# Patient Record
Sex: FEMALE | URBAN - METROPOLITAN AREA
[De-identification: names, ages, dates, MRNs, and addresses within clinical notes are randomized per-mention and may not be internally consistent; named-entity substitution may affect disease eponyms.]

---

## 2020-10-15 ENCOUNTER — OFFICE VISIT (OUTPATIENT)
Dept: URBAN - METROPOLITAN AREA CLINIC 75 | Facility: CLINIC | Age: 75
End: 2020-10-15
Payer: MEDICARE

## 2020-10-15 PROCEDURE — 92014 COMPRE OPH EXAM EST PT 1/>: CPT | Performed by: OPTOMETRIST

## 2020-10-15 PROCEDURE — 92133 CPTRZD OPH DX IMG PST SGM ON: CPT | Performed by: OPTOMETRIST

## 2020-10-15 PROCEDURE — 92134 CPTRZ OPH DX IMG PST SGM RTA: CPT | Performed by: OPTOMETRIST

## 2020-10-15 ASSESSMENT — INTRAOCULAR PRESSURE
OD: 19
OS: 19

## 2020-10-15 NOTE — IMPRESSION/PLAN
Impression: Puckering of macula, right eye: H35.371. Plan: OD: Discussed diagnosis in detail with patient. Discussed treatment options with patient. Discussed risks and patient understands. Will continue to observe condition and or symptoms.

## 2020-12-07 ENCOUNTER — OFFICE VISIT (OUTPATIENT)
Dept: URBAN - METROPOLITAN AREA CLINIC 75 | Facility: CLINIC | Age: 75
End: 2020-12-07
Payer: COMMERCIAL

## 2020-12-07 DIAGNOSIS — H52.4 PRESBYOPIA: Primary | ICD-10-CM

## 2020-12-07 DIAGNOSIS — H35.371 PUCKERING OF MACULA, RIGHT EYE: ICD-10-CM

## 2020-12-07 PROCEDURE — 92014 COMPRE OPH EXAM EST PT 1/>: CPT | Performed by: OPTOMETRIST

## 2020-12-07 ASSESSMENT — KERATOMETRY
OD: 41.88
OS: 42.25

## 2020-12-07 ASSESSMENT — INTRAOCULAR PRESSURE
OS: 12
OD: 12

## 2020-12-07 ASSESSMENT — VISUAL ACUITY
OS: 20/25
OD: 20/30

## 2020-12-07 NOTE — IMPRESSION/PLAN
Impression: Presbyopia: H52.4 - I counseled the patient regarding the following: Ty Goes occurs as we get older and is the inability to focus on objects (ie: accomodate) due to the loss of flexiblity of your natural lens. Reading glasses, prism glasses, bifocals, trifocals or contact lenses can be helpful. Plan: New glasses rx given to patient today. 
Pt to contact office if difficulty with vision persists despite corrective eyewear

## 2020-12-07 NOTE — IMPRESSION/PLAN
Impression: Puckering of macula, right eye: H35.371 - Discussed diagnosis in detail with patient. OCT done in October and ERM was stable. Plan: Observe.  Will re-eval w/ OCT in April

## 2021-01-18 ENCOUNTER — OFFICE VISIT (OUTPATIENT)
Dept: URBAN - METROPOLITAN AREA CLINIC 75 | Facility: CLINIC | Age: 76
End: 2021-01-18

## 2021-01-18 DIAGNOSIS — H53.9 VISUAL DISTURBANCE: Primary | ICD-10-CM

## 2021-01-18 PROCEDURE — V2799 MISC VISION ITEM OR SERVICE: HCPCS | Performed by: OPTOMETRIST

## 2021-01-18 NOTE — IMPRESSION/PLAN
Impression: Visual disturbance: H53.9 - Pt here for refraction recheck. Plan: No changes to rx today. Discussed lenses appear to have been placed in frame upside down with add segment up top. Pt to see optical department for adjustments to lens placement.

## 2021-02-02 ENCOUNTER — OFFICE VISIT (OUTPATIENT)
Dept: URBAN - METROPOLITAN AREA CLINIC 75 | Facility: CLINIC | Age: 76
End: 2021-02-02

## 2021-02-02 PROCEDURE — V2799 MISC VISION ITEM OR SERVICE: HCPCS | Performed by: OPTOMETRIST

## 2021-02-02 ASSESSMENT — VISUAL ACUITY
OS: 20/20
OD: 20/30

## 2021-02-02 NOTE — IMPRESSION/PLAN
Impression: Presbyopia: H52.4 - Pt here for refraction r/c. Plan: New glasses rx given to patient today. Most change is to OS lens. 

Keep appts as scheduled

## 2021-03-18 ENCOUNTER — OFFICE VISIT (OUTPATIENT)
Dept: URBAN - METROPOLITAN AREA CLINIC 75 | Facility: CLINIC | Age: 76
End: 2021-03-18
Payer: MEDICARE

## 2021-03-18 DIAGNOSIS — H10.45 OTHER CHRONIC ALLERGIC CONJUNCTIVITIS: Primary | ICD-10-CM

## 2021-03-18 PROCEDURE — 99213 OFFICE O/P EST LOW 20 MIN: CPT | Performed by: OPTOMETRIST

## 2021-03-18 RX ORDER — PREDNISOLONE ACETATE 10 MG/ML
1 % SUSPENSION/ DROPS OPHTHALMIC
Qty: 5 | Refills: 1 | Status: INACTIVE
Start: 2021-03-18 | End: 2021-04-15

## 2021-03-18 ASSESSMENT — INTRAOCULAR PRESSURE
OD: 16
OS: 14

## 2021-03-18 NOTE — IMPRESSION/PLAN
Impression: Other chronic allergic conjunctivitis: H10.45. Plan: Allergic conjunctivitis causes red, watery, itchy or gritty eyes. The condition is not usually painful and does not make eyes sensitive to light. 
Use Pred OD PRN

## 2021-04-15 ENCOUNTER — OFFICE VISIT (OUTPATIENT)
Dept: URBAN - METROPOLITAN AREA CLINIC 75 | Facility: CLINIC | Age: 76
End: 2021-04-15
Payer: MEDICARE

## 2021-04-15 DIAGNOSIS — H35.363 DRUSEN (DEGENERATIVE) OF MACULA, BILATERAL: ICD-10-CM

## 2021-04-15 PROCEDURE — 92014 COMPRE OPH EXAM EST PT 1/>: CPT | Performed by: OPTOMETRIST

## 2021-04-15 PROCEDURE — 92134 CPTRZ OPH DX IMG PST SGM RTA: CPT | Performed by: OPTOMETRIST

## 2021-04-15 ASSESSMENT — INTRAOCULAR PRESSURE
OS: 10
OD: 8

## 2021-04-15 NOTE — IMPRESSION/PLAN
Impression: Drusen (degenerative) of macula, bilateral: H35.363. Plan: Drusen with stable vision. Will continue to monitor vision and the patient has been instructed to call with any vision changes. Recommend starting eye vitamins and dark green vegetables.

## 2021-04-15 NOTE — IMPRESSION/PLAN
Impression: Puckering of macula, right eye: H35.371. Plan: OCT ordered- no progression seen in exam or scans. Discussed that ERMS do not usually require treatment, unless it is affecting patients daily life. Pt to contact office w/ any loss or distortion of vision.

## 2021-04-15 NOTE — IMPRESSION/PLAN
Impression: Combined forms of age-related cataract, bilateral: H25.813. Plan: Vision is stable. No treatment currently recommended. The patient will monitor vision changes and contact us with any decrease in vision.

## 2021-06-29 ENCOUNTER — OFFICE VISIT (OUTPATIENT)
Dept: URBAN - METROPOLITAN AREA CLINIC 75 | Facility: CLINIC | Age: 76
End: 2021-06-29
Payer: MEDICARE

## 2021-06-29 DIAGNOSIS — H43.812 VITREOUS DEGENERATION, LEFT EYE: ICD-10-CM

## 2021-06-29 PROCEDURE — 92014 COMPRE OPH EXAM EST PT 1/>: CPT | Performed by: OPTOMETRIST

## 2021-06-29 RX ORDER — PREDNISOLONE ACETATE 10 MG/ML
1 % SUSPENSION/ DROPS OPHTHALMIC
Qty: 5 | Refills: 0 | Status: INACTIVE
Start: 2021-06-29 | End: 2021-06-29

## 2021-06-29 ASSESSMENT — INTRAOCULAR PRESSURE
OS: 9
OD: 12

## 2021-06-29 NOTE — IMPRESSION/PLAN
Impression: Puckering of macula, right eye: H35.371-Optos performed. Plan: Epiretinal membranes do not usually require treatment, unless distorted vision or blurry vision occur. The main treatment consists of vitrectomy surgery with peeling off the ERM. Contact office if you experience a loss of vision or distortion.

## 2021-06-29 NOTE — IMPRESSION/PLAN
Impression: Dry eye syndrome of bilateral lacrimal glands: H04.123-Dry eyes account for the patient's complaints. Plan: Recommend 3-4 drops daily of Systane Balance or Refresh tears. Will begin pt on pred acetate BID for 2 weeks OU. Contact office if dry eye does not improve, worsens or blurs vision.

## 2021-06-29 NOTE — IMPRESSION/PLAN
Impression: Vitreous degeneration, left eye: H43.812-Posterior vitreous detachment accounts for the patient's complaints. Plan: Reviewed the signs and symptoms of a retinal tear and detachment in detail with the patient, including worsening flashes, new floaters, and development of a shadow/curtain in the peripheral visual field. The patient was advised to call immediately with any changes to [place] or increase in symptoms.

## 2021-10-01 ENCOUNTER — OFFICE VISIT (OUTPATIENT)
Dept: URBAN - METROPOLITAN AREA CLINIC 75 | Facility: CLINIC | Age: 76
End: 2021-10-01
Payer: COMMERCIAL

## 2021-10-01 PROCEDURE — 92014 COMPRE OPH EXAM EST PT 1/>: CPT

## 2021-10-01 ASSESSMENT — VISUAL ACUITY
OS: 20/20
OD: 20/20

## 2021-10-01 ASSESSMENT — INTRAOCULAR PRESSURE
OD: 12
OS: 13

## 2021-10-01 NOTE — IMPRESSION/PLAN
Impression: Vitreous degeneration, left eye: H43.812-Posterior vitreous detachment accounts for the patient's complaints. Plan: Reviewed the signs and symptoms of a retinal tear and detachment in detail with the patient, including worsening flashes, new floaters, and development of a shadow/curtain in the peripheral visual field. The patient was advised to call immediately with any changes to [address] [facility] or increase in symptoms.

## 2021-10-01 NOTE — IMPRESSION/PLAN
Impression: Puckering of macula, right eye: H35.371-Stable. Plan: Epiretinal membranes do not usually require treatment, unless distorted vision or blurry vision occur. The main treatment consists of vitrectomy surgery with peeling off the ERM. Contact office if you experience a loss of vision or distortion.

## 2021-10-01 NOTE — IMPRESSION/PLAN
Impression: Dry eye syndrome of bilateral lacrimal glands: H04.123-Dry eyes account for the patient's complaints. Plan: Recommend 3-4 drops daily of Systane Balance or Refresh tears and gel/ointment at bedtime. Will start patient FML BID for 2 weeks, then once daily for 1 week. then D/C. Contact office if dry eye does not improve, worsens or blurs vision. R/C dry eye in 1 month.

## 2021-10-14 ENCOUNTER — OFFICE VISIT (OUTPATIENT)
Dept: URBAN - METROPOLITAN AREA CLINIC 75 | Facility: CLINIC | Age: 76
End: 2021-10-14
Payer: MEDICARE

## 2021-10-14 PROCEDURE — 68761 CLOSE TEAR DUCT OPENING: CPT | Performed by: OPTOMETRIST

## 2021-10-14 PROCEDURE — 99213 OFFICE O/P EST LOW 20 MIN: CPT | Performed by: OPTOMETRIST

## 2021-10-14 ASSESSMENT — INTRAOCULAR PRESSURE
OD: 14
OS: 16

## 2021-10-14 NOTE — IMPRESSION/PLAN
Impression: Dry eye syndrome of bilateral lacrimal glands: H04.123. Fluoro causing issue for pt. Temporary medium Punctual plugs applied today. Plan: Discussed dry eye syndrome requires lubrication of the eye with artificial tears and nighttime ointments or gels. In addition, topical and oral anti-inflammatory medications are usually necessary to treat the associated ocular irritation. Use your eyedrops and lubricating ointments on a regular basis, and don't wait until your eyes get dry or start burning before using them. Pt  to stop fluorometholone and continue Lumify gtts. Pt to try temporary and return for medium permanent if symptoms are relived. Pt to contact office if dry eye syndrome does not improve or worsens despite treatment or if vision gets blurry.

## 2021-12-01 ENCOUNTER — OFFICE VISIT (OUTPATIENT)
Dept: URBAN - METROPOLITAN AREA CLINIC 75 | Facility: CLINIC | Age: 76
End: 2021-12-01
Payer: MEDICARE

## 2021-12-01 PROCEDURE — 99213 OFFICE O/P EST LOW 20 MIN: CPT | Performed by: OPTOMETRIST

## 2021-12-01 RX ORDER — PREDNISOLONE ACETATE 10 MG/ML
1 % SUSPENSION/ DROPS OPHTHALMIC
Qty: 5 | Refills: 0 | Status: INACTIVE
Start: 2021-12-01 | End: 2021-12-01

## 2021-12-01 ASSESSMENT — INTRAOCULAR PRESSURE
OS: 23
OD: 21

## 2021-12-01 NOTE — IMPRESSION/PLAN
Impression: Other chronic allergic conjunctivitis: H10.45- Educated pt on findings. Plan: Will begin pt on pred acetate BID fro 2 weeks left eye only. If OD begin to itch pt okay to use drop in right eye. Recommend OTC artificial tears 2-3x daily.

## 2021-12-06 ENCOUNTER — OFFICE VISIT (OUTPATIENT)
Dept: URBAN - METROPOLITAN AREA CLINIC 75 | Facility: CLINIC | Age: 76
End: 2021-12-06
Payer: MEDICARE

## 2021-12-06 DIAGNOSIS — H04.322 ACUTE DACRYOCYSTITIS OF LEFT LACRIMAL PASSAGE: Primary | ICD-10-CM

## 2021-12-06 PROCEDURE — 99212 OFFICE O/P EST SF 10 MIN: CPT | Performed by: OPTOMETRIST

## 2021-12-06 RX ORDER — TOBRAMYCIN AND DEXAMETHASONE 3; 1 MG/ML; MG/ML
SUSPENSION/ DROPS OPHTHALMIC
Qty: 5 | Refills: 0 | Status: INACTIVE
Start: 2021-12-06 | End: 2021-12-06

## 2021-12-06 RX ORDER — NEOMYCIN SULFATE, POLYMYXIN B SULFATE AND DEXAMETHASONE 3.5; 10000; 1 MG/ML; [USP'U]/ML; MG/ML
SUSPENSION/ DROPS OPHTHALMIC
Qty: 5 | Refills: 0 | Status: INACTIVE
Start: 2021-12-06 | End: 2021-12-06

## 2021-12-06 ASSESSMENT — INTRAOCULAR PRESSURE
OD: 11
OS: 12

## 2021-12-06 NOTE — IMPRESSION/PLAN
Impression: Acute dacryocystitis of left lacrimal passage: F19.163. Plan: OS: Discussed diagnosis in detail with patient. Recommend D/C the PRED and starting Tobradex TID OU x10 days. Ok to use in the right eye as well as a precautionary treatment. Will R/C when PT F/U in 2 days.

## 2022-01-24 ENCOUNTER — OFFICE VISIT (OUTPATIENT)
Dept: URBAN - METROPOLITAN AREA CLINIC 75 | Facility: CLINIC | Age: 77
End: 2022-01-24
Payer: MEDICARE

## 2022-01-24 PROCEDURE — 99213 OFFICE O/P EST LOW 20 MIN: CPT | Performed by: OPTOMETRIST

## 2022-01-24 RX ORDER — PREDNISOLONE ACETATE 10 MG/ML
1 % SUSPENSION/ DROPS OPHTHALMIC
Qty: 1 | Refills: 0 | Status: INACTIVE
Start: 2022-01-24 | End: 2022-01-24

## 2022-01-24 ASSESSMENT — INTRAOCULAR PRESSURE
OD: 14
OS: 11

## 2022-01-24 NOTE — IMPRESSION/PLAN
Impression: Dry eye syndrome of bilateral lacrimal glands: H04.123-OD>OS. Discussed possible permanent plugs, pt states when she was in last visit Dr. Janeth Onofre put temp plugs in but they fell pout shortly after visit. Pt elects permanent plugs. Plan: Recommend pt continue preservative free OTC lubrication drops 3-4x daily OU. Advised pt to use gel drops at bedtime. Pt to start pred acetate BID OU for 10 days the D/C Permeant plug OD: (.6) Lot # [id number] , OS: (.5) lot # [id number]

## 2022-02-14 ENCOUNTER — OFFICE VISIT (OUTPATIENT)
Dept: URBAN - METROPOLITAN AREA CLINIC 75 | Facility: CLINIC | Age: 77
End: 2022-02-14
Payer: MEDICARE

## 2022-02-14 DIAGNOSIS — H02.109 ECTROPION OF EYE: ICD-10-CM

## 2022-02-14 DIAGNOSIS — H04.123 DRY EYE SYNDROME OF BILATERAL LACRIMAL GLANDS: Primary | ICD-10-CM

## 2022-02-14 DIAGNOSIS — H47.10 PAPILLEDEMA: ICD-10-CM

## 2022-02-14 DIAGNOSIS — H02.839 DERMATOCHALASIS OF EYELID: ICD-10-CM

## 2022-02-14 PROCEDURE — 99213 OFFICE O/P EST LOW 20 MIN: CPT | Performed by: OPTOMETRIST

## 2022-02-14 ASSESSMENT — INTRAOCULAR PRESSURE
OS: 14
OD: 12

## 2022-02-14 NOTE — IMPRESSION/PLAN
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 Papilledema is a condition in which increased pressure in or around the brain causes the part of the optic nerve inside the eye to swell. Symptoms may be fleeting disturbances in vision, headache, vomiting, or a combination. Plan: Discussed. Patient recommended to see PCP for testing if neck and back pain continue or worsen.

## 2022-02-14 NOTE — IMPRESSION/PLAN
Impression: Dry eye syndrome of bilateral lacrimal glands: H04.123. Plan: Discussed dry eye syndrome requires lubrication of the eye with artificial tears and nighttime ointments or gels. In addition, topical and oral anti inflammatory medications are usually necessary to treat the associated ocular irritation. Pt given sample of MEIBO Tears to use 4x a day OU. Pt to begin Lid Scrubs twice a week at night. Use your eyedrops and lubricating ointments on a regular basis, and don't wait until your eyes get dry or start burning before using them. Pt to contact office if dry eye syndrome does not improve or worsens despite treatment or if vision gets blurry.

## 2022-02-14 NOTE — IMPRESSION/PLAN
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 Plan: Discussed options with patient. 

Pt to consult Dr. Randolph Pichardo for Sheree Hale0.

## 2022-03-30 ENCOUNTER — OFFICE VISIT (OUTPATIENT)
Dept: URBAN - METROPOLITAN AREA CLINIC 75 | Facility: CLINIC | Age: 77
End: 2022-03-30
Payer: MEDICARE

## 2022-03-30 DIAGNOSIS — H10.413 CHRONIC GIANT PAPILLARY CONJUNCTIVITIS, BILATERAL: Primary | ICD-10-CM

## 2022-03-30 DIAGNOSIS — H25.813 COMBINED FORMS OF AGE-RELATED CATARACT, BILATERAL: ICD-10-CM

## 2022-03-30 PROCEDURE — 99213 OFFICE O/P EST LOW 20 MIN: CPT | Performed by: OPTOMETRIST

## 2022-03-30 RX ORDER — NEOMYCIN SULFATE, POLYMYXIN B SULFATE AND DEXAMETHASONE 3.5; 10000; 1 MG/ML; [USP'U]/ML; MG/ML
SUSPENSION/ DROPS OPHTHALMIC
Qty: 5 | Refills: 0 | Status: INACTIVE
Start: 2022-03-30 | End: 2022-03-30

## 2022-03-30 ASSESSMENT — INTRAOCULAR PRESSURE
OS: 8
OD: 9

## 2022-03-30 NOTE — IMPRESSION/PLAN
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 Plan: Begin Neopolydex 3x a day in both eyes for 1 weeks Taper as instructed If eyes get red switch to Pred drops

## 2022-04-06 ENCOUNTER — OFFICE VISIT (OUTPATIENT)
Dept: URBAN - METROPOLITAN AREA CLINIC 75 | Facility: CLINIC | Age: 77
End: 2022-04-06
Payer: MEDICARE

## 2022-04-06 DIAGNOSIS — H02.811 RETAINED FOREIGN BODY IN RIGHT UPPER EYELID: Primary | ICD-10-CM

## 2022-04-06 DIAGNOSIS — H02.052 TRICHIASIS WITHOUT ENTROPION RIGHT LOWER EYELID: ICD-10-CM

## 2022-04-06 DIAGNOSIS — H02.051 TRICHIASIS WITHOUT ENTROPION RIGHT UPPER EYELID: ICD-10-CM

## 2022-04-06 PROCEDURE — 99214 OFFICE O/P EST MOD 30 MIN: CPT | Performed by: OPTOMETRIST

## 2022-04-06 PROCEDURE — 67820 REVISE EYELASHES: CPT | Performed by: OPTOMETRIST

## 2022-04-06 PROCEDURE — 10120 INC&RMVL FB SUBQ TISS SMPL: CPT | Performed by: OPTOMETRIST

## 2022-04-06 ASSESSMENT — INTRAOCULAR PRESSURE
OS: 19
OD: 21

## 2022-04-06 NOTE — IMPRESSION/PLAN
Impression: Retained foreign body in right upper eyelid: H02.811.

unidentified FB removed from inner lid Plan: Continue drops as directed

## 2022-04-06 NOTE — IMPRESSION/PLAN
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  Plan: Consent given to remove two lashes OD upper and lower

## 2022-04-11 ENCOUNTER — OFFICE VISIT (OUTPATIENT)
Dept: URBAN - METROPOLITAN AREA CLINIC 44 | Facility: CLINIC | Age: 77
End: 2022-04-11
Payer: MEDICARE

## 2022-04-11 DIAGNOSIS — H02.831 DERMATOCHALASIS OF RIGHT UPPER EYELID: ICD-10-CM

## 2022-04-11 DIAGNOSIS — H04.123 DRY EYE SYNDROME OF BILATERAL LACRIMAL GLANDS: Primary | ICD-10-CM

## 2022-04-11 DIAGNOSIS — H02.834 DERMATOCHALASIS OF LEFT UPPER EYELID: ICD-10-CM

## 2022-04-11 PROCEDURE — 92285 EXTERNAL OCULAR PHOTOGRAPHY: CPT | Performed by: OPHTHALMOLOGY

## 2022-04-11 PROCEDURE — 92081 LIMITED VISUAL FIELD XM: CPT | Performed by: OPHTHALMOLOGY

## 2022-04-11 PROCEDURE — 99204 OFFICE O/P NEW MOD 45 MIN: CPT | Performed by: OPHTHALMOLOGY

## 2022-07-07 ENCOUNTER — OFFICE VISIT (OUTPATIENT)
Dept: URBAN - METROPOLITAN AREA CLINIC 75 | Facility: CLINIC | Age: 77
End: 2022-07-07
Payer: MEDICARE

## 2022-07-07 DIAGNOSIS — H25.813 COMBINED FORMS OF AGE-RELATED CATARACT, BILATERAL: Primary | ICD-10-CM

## 2022-07-07 DIAGNOSIS — H52.4 PRESBYOPIA: ICD-10-CM

## 2022-07-07 DIAGNOSIS — H35.371 PUCKERING OF MACULA, RIGHT EYE: ICD-10-CM

## 2022-07-07 DIAGNOSIS — H47.10 PAPILLEDEMA: ICD-10-CM

## 2022-07-07 PROCEDURE — 99213 OFFICE O/P EST LOW 20 MIN: CPT | Performed by: OPTOMETRIST

## 2022-07-07 ASSESSMENT — INTRAOCULAR PRESSURE
OD: 15
OS: 16

## 2022-07-07 ASSESSMENT — VISUAL ACUITY
OD: 20/30
OS: 20/30

## 2022-11-18 ENCOUNTER — POST-OPERATIVE VISIT (OUTPATIENT)
Dept: URBAN - METROPOLITAN AREA CLINIC 72 | Facility: CLINIC | Age: 77
End: 2022-11-18
Payer: MEDICARE

## 2022-11-18 DIAGNOSIS — Z48.89 ENCOUNTER FOR OTHER SPECIFIED SURGICAL AFTERCARE: Primary | ICD-10-CM

## 2022-11-18 PROCEDURE — 99024 POSTOP FOLLOW-UP VISIT: CPT | Performed by: OPTOMETRIST

## 2022-11-18 ASSESSMENT — INTRAOCULAR PRESSURE
OD: 17
OS: 17

## 2022-11-18 NOTE — IMPRESSION/PLAN
Impression: S/P Both Upper Eyelid Blepharoplasty OU - 9 Days. Encounter for other specified surgical aftercare  Z48.89. Excellent post op course   Post operative instructions reviewed - Condition is improving - Plan: Educated pt on suture removal. All questions answered. Sutures removed in clinic today, without complications. Pt can D/c cassandra or may use for itching/comfort PRN Recommend pt to keep scheduled appt in jan --Advised patient to use artificial tears for comfort.

## 2022-11-30 ENCOUNTER — OFFICE VISIT (OUTPATIENT)
Dept: URBAN - METROPOLITAN AREA CLINIC 75 | Facility: CLINIC | Age: 77
End: 2022-11-30
Payer: MEDICARE

## 2022-11-30 DIAGNOSIS — Z48.89 ENCOUNTER FOR OTHER SPECIFIED SURGICAL AFTERCARE: Primary | ICD-10-CM

## 2022-11-30 PROCEDURE — 99024 POSTOP FOLLOW-UP VISIT: CPT | Performed by: OPTOMETRIST

## 2022-11-30 NOTE — IMPRESSION/PLAN
Impression: Encounter for other specified surgical aftercare: Z48.89 OU. Health with no infections. SPK scratch cornea OD Plan: Discussed, Recommended pt begin Systane Balance gtts again 3-4x a day.

## 2022-12-01 ENCOUNTER — OFFICE VISIT (OUTPATIENT)
Dept: URBAN - METROPOLITAN AREA CLINIC 75 | Facility: CLINIC | Age: 77
End: 2022-12-01
Payer: MEDICARE

## 2022-12-01 DIAGNOSIS — H25.813 COMBINED FORMS OF AGE-RELATED CATARACT, BILATERAL: ICD-10-CM

## 2022-12-01 DIAGNOSIS — H01.009 BLEPHARITIS OF EYELID: Primary | ICD-10-CM

## 2022-12-01 PROCEDURE — 99213 OFFICE O/P EST LOW 20 MIN: CPT | Performed by: OPTOMETRIST

## 2022-12-01 RX ORDER — PREDNISOLONE ACETATE 10 MG/ML
1 % SUSPENSION/ DROPS OPHTHALMIC
Qty: 5 | Refills: 1 | Status: ACTIVE
Start: 2022-12-01

## 2022-12-01 ASSESSMENT — INTRAOCULAR PRESSURE
OD: 18
OS: 10

## 2022-12-12 ENCOUNTER — OFFICE VISIT (OUTPATIENT)
Dept: URBAN - METROPOLITAN AREA CLINIC 75 | Facility: CLINIC | Age: 77
End: 2022-12-12
Payer: MEDICARE

## 2022-12-12 DIAGNOSIS — H01.009 BLEPHARITIS OF EYELID: Primary | ICD-10-CM

## 2022-12-12 PROCEDURE — 99213 OFFICE O/P EST LOW 20 MIN: CPT | Performed by: OPTOMETRIST

## 2022-12-12 ASSESSMENT — INTRAOCULAR PRESSURE
OD: 13
OS: 9

## 2022-12-12 NOTE — IMPRESSION/PLAN
Impression: *Dermatitis* Blepharitis of eyelid: H01.009 Bilateral. Plan: Discussed. Educated pt she could have developed a reaction to makeup. Recommend testing after clearing up symptoms with makeup on one side after a couple weeks of. Pt recommended to continue cold peas OU for inflammation and warm compressions w/o soap. PF gtts only at this time such as lillie packs.

## 2022-12-20 ENCOUNTER — OFFICE VISIT (OUTPATIENT)
Dept: URBAN - METROPOLITAN AREA CLINIC 75 | Facility: CLINIC | Age: 77
End: 2022-12-20
Payer: MEDICARE

## 2022-12-20 DIAGNOSIS — H01.009 BLEPHARITIS OF EYELID: Primary | ICD-10-CM

## 2022-12-20 DIAGNOSIS — H04.123 DRY EYE SYNDROME OF BILATERAL LACRIMAL GLANDS: ICD-10-CM

## 2022-12-20 PROCEDURE — 99213 OFFICE O/P EST LOW 20 MIN: CPT | Performed by: OPTOMETRIST

## 2022-12-20 RX ORDER — TOBRAMYCIN AND DEXAMETHASONE 3; 1 MG/G; MG/G
OINTMENT OPHTHALMIC
Qty: 3.5 | Refills: 0 | Status: ACTIVE
Start: 2022-12-20

## 2022-12-20 ASSESSMENT — INTRAOCULAR PRESSURE
OD: 12
OS: 10

## 2022-12-20 NOTE — IMPRESSION/PLAN
Impression: Blepharitis of eyelid: H01.009. Plan: Blepharitis is a common eye condition that makes your eyelids red, swollen, irritated, and itchy. It can cause crusty dandruff-like flakes on your eyelashes. Blepharitis can be uncomfortable. But it isn't contagious, and it usually doesn't cause any lasting damage to your eyes Pt to begin Tobradex ointment BID OU for 1 week. Recommended pt begin lid scrubs and warm compression twice a day.

## 2022-12-20 NOTE — IMPRESSION/PLAN
Impression: Dry eye syndrome of bilateral lacrimal glands: H04.123. Dry eye creating scratches OU accounting for pain Plan: Discussed. Recommended pt continue sterile or PF gtts as directed.

## 2022-12-28 ENCOUNTER — OFFICE VISIT (OUTPATIENT)
Dept: URBAN - METROPOLITAN AREA CLINIC 75 | Facility: CLINIC | Age: 77
End: 2022-12-28
Payer: MEDICARE

## 2022-12-28 DIAGNOSIS — H04.123 DRY EYE SYNDROME OF BILATERAL LACRIMAL GLANDS: ICD-10-CM

## 2022-12-28 DIAGNOSIS — H01.001 BLEPHARITIS OF RIGHT UPPER EYELID: Primary | ICD-10-CM

## 2022-12-28 PROCEDURE — 99213 OFFICE O/P EST LOW 20 MIN: CPT | Performed by: OPTOMETRIST

## 2022-12-28 ASSESSMENT — INTRAOCULAR PRESSURE
OD: 14
OS: 11

## 2022-12-28 NOTE — IMPRESSION/PLAN
Impression: Dry eye syndrome of bilateral lacrimal glands: H04.123. Plan: Discussed. Recommended pt continue  or PF gtts as directed. Begin Systayne ointment QHS OU.

## 2022-12-28 NOTE — IMPRESSION/PLAN
Impression: Blepharitis of right upper eyelid: H01.001. Plan: Blepharitis is a common eye condition that makes your eyelids red, swollen, irritated, and itchy. It can cause crusty dandruff-like flakes on your eyelashes. Blepharitis can be uncomfortable. But it isn't contagious, and it usually doesn't cause any lasting damage to your eyes Pt to DC Tobradex. Recommended pt begin lid scrubs and warm compression twice a day.

## 2023-01-19 ENCOUNTER — OFFICE VISIT (OUTPATIENT)
Dept: URBAN - METROPOLITAN AREA CLINIC 75 | Facility: CLINIC | Age: 78
End: 2023-01-19
Payer: MEDICARE

## 2023-01-19 DIAGNOSIS — H25.13 AGE-RELATED NUCLEAR CATARACT, BILATERAL: ICD-10-CM

## 2023-01-19 DIAGNOSIS — H04.123 DRY EYE SYNDROME OF BILATERAL LACRIMAL GLANDS: Primary | ICD-10-CM

## 2023-01-19 PROCEDURE — 99213 OFFICE O/P EST LOW 20 MIN: CPT

## 2023-01-19 RX ORDER — FLUOROMETHOLONE 1 MG/ML
0.1 % SOLUTION/ DROPS OPHTHALMIC
Qty: 5 | Refills: 0 | Status: ACTIVE
Start: 2023-01-19

## 2023-01-19 ASSESSMENT — INTRAOCULAR PRESSURE
OD: 12
OS: 9

## 2023-01-19 NOTE — IMPRESSION/PLAN
Impression: Dry eye syndrome of bilateral lacrimal glands: H04.123. ANTHONY ordered and performed for baseline of dryness. Plan: Dry eyes account for the patient's complaints. There is no evidence of permanent changes to the cornea. Explained condition does not have a cure and will need artificial tears for maintenance. 

PT to use FML x1wk BID OU to reduce  inflammation on cornea

## 2023-06-20 ENCOUNTER — OFFICE VISIT (OUTPATIENT)
Dept: URBAN - METROPOLITAN AREA CLINIC 75 | Facility: CLINIC | Age: 78
End: 2023-06-20
Payer: MEDICARE

## 2023-06-20 DIAGNOSIS — H04.123 DRY EYE SYNDROME OF BILATERAL LACRIMAL GLANDS: Primary | ICD-10-CM

## 2023-06-20 DIAGNOSIS — H43.813 VITREOUS DEGENERATION, BILATERAL: ICD-10-CM

## 2023-06-20 PROCEDURE — 99213 OFFICE O/P EST LOW 20 MIN: CPT | Performed by: OPTOMETRIST

## 2023-06-20 RX ORDER — PREDNISOLONE ACETATE 10 MG/ML
1 % SUSPENSION/ DROPS OPHTHALMIC
Qty: 5 | Refills: 0 | Status: ACTIVE
Start: 2023-06-20

## 2023-06-20 ASSESSMENT — INTRAOCULAR PRESSURE
OS: 15
OD: 17

## 2023-06-20 NOTE — IMPRESSION/PLAN
Impression: Vitreous degeneration, bilateral: H43.813. New DX OD 

 OPTOS ordered and performed No tears or holes found at this time. Plan: Posterior vitreous detachments (PVD) usually diminish with time, but it may take several months. They rarely disappear entirely. PVD is a normal aging change due to vitreous jelly pulling away from the retinal lining of the eye. They may accompany retinal tears, retinal holes, or retinal detachments. Contact office if symptoms worsen, including increased floaters, flashing light, loss of vision or a black curtain blocking your field of vision.

## 2023-06-20 NOTE — IMPRESSION/PLAN
Impression: Dry eye syndrome of bilateral lacrimal glands: H04.123. Plan: Dry eye syndrome requires lubrication of the eye with artificial tears and nighttime ointments or gels. In addition, topical and oral anti-inflammatory medications are usually necessary to treat the associated ocular irritation. Recommend 3-4 drops daily of OTC drops such as PF Systane or PF Refresh. Pt to begin Pred BID OU for 2 weeks. Contact office if dry eye does not improve, worsens or blurs vision.

## 2023-07-05 ENCOUNTER — OFFICE VISIT (OUTPATIENT)
Dept: URBAN - METROPOLITAN AREA CLINIC 75 | Facility: CLINIC | Age: 78
End: 2023-07-05
Payer: MEDICARE

## 2023-07-05 DIAGNOSIS — H04.123 DRY EYE SYNDROME OF BILATERAL LACRIMAL GLANDS: Primary | ICD-10-CM

## 2023-07-05 PROCEDURE — 99212 OFFICE O/P EST SF 10 MIN: CPT | Performed by: OPTOMETRIST

## 2023-07-05 ASSESSMENT — INTRAOCULAR PRESSURE
OS: 14
OD: 19

## 2023-07-05 NOTE — IMPRESSION/PLAN
Impression: Dry eye syndrome of bilateral lacrimal glands: H04.123. Plan: Pt is showing improvement Recommend pt to continue with OTC drops such as PF Systane or PF Refresh and continue with Pred BID OU for 2 weeks then QD x 2 wks then D/c. Contact office if dry eye does not improve, worsens or blurs vision.

## 2023-11-01 ENCOUNTER — OFFICE VISIT (OUTPATIENT)
Dept: URBAN - METROPOLITAN AREA CLINIC 75 | Facility: CLINIC | Age: 78
End: 2023-11-01
Payer: MEDICARE

## 2023-11-01 DIAGNOSIS — S05.01XA CORNEAL ABRASION OF RIGHT EYE, INITIAL ENCOUNTER: Primary | ICD-10-CM

## 2023-11-01 DIAGNOSIS — H04.123 DRY EYE SYNDROME OF BILATERAL LACRIMAL GLANDS: ICD-10-CM

## 2023-11-01 PROCEDURE — 99213 OFFICE O/P EST LOW 20 MIN: CPT | Performed by: OPTOMETRIST

## 2023-11-01 ASSESSMENT — INTRAOCULAR PRESSURE
OS: 14
OD: 15

## 2024-01-10 ENCOUNTER — OFFICE VISIT (OUTPATIENT)
Dept: URBAN - METROPOLITAN AREA CLINIC 75 | Facility: CLINIC | Age: 79
End: 2024-01-10
Payer: MEDICARE

## 2024-01-10 DIAGNOSIS — H02.889 MGD OF EYE: ICD-10-CM

## 2024-01-10 DIAGNOSIS — B88.0 OTHER ACARIASIS: ICD-10-CM

## 2024-01-10 DIAGNOSIS — H01.009 BLEPHARITIS OF EYELID: Primary | ICD-10-CM

## 2024-01-10 PROCEDURE — 99213 OFFICE O/P EST LOW 20 MIN: CPT | Performed by: OPTOMETRIST

## 2024-01-10 RX ORDER — LOTILANER OPHTHALMIC SOLUTION 2.5 MG/ML
0.25 % SOLUTION/ DROPS OPHTHALMIC
Qty: 10 | Refills: 0 | Status: INACTIVE
Start: 2024-01-10 | End: 2024-02-20

## 2024-01-10 ASSESSMENT — INTRAOCULAR PRESSURE
OD: 14
OS: 12

## 2024-03-07 ENCOUNTER — OFFICE VISIT (OUTPATIENT)
Dept: URBAN - METROPOLITAN AREA CLINIC 75 | Facility: CLINIC | Age: 79
End: 2024-03-07
Payer: MEDICARE

## 2024-03-07 DIAGNOSIS — B88.0 OTHER ACARIASIS: Primary | ICD-10-CM

## 2024-03-07 PROCEDURE — 99213 OFFICE O/P EST LOW 20 MIN: CPT | Performed by: OPTOMETRIST

## 2024-03-07 ASSESSMENT — INTRAOCULAR PRESSURE
OD: 15
OS: 13

## 2024-04-08 ENCOUNTER — OFFICE VISIT (OUTPATIENT)
Dept: URBAN - METROPOLITAN AREA CLINIC 75 | Facility: CLINIC | Age: 79
End: 2024-04-08
Payer: MEDICARE

## 2024-04-08 DIAGNOSIS — H01.004 BLEPHARITIS OF LEFT UPPER EYELID: ICD-10-CM

## 2024-04-08 DIAGNOSIS — H01.001 BLEPHARITIS OF RIGHT UPPER EYELID: Primary | ICD-10-CM

## 2024-04-08 DIAGNOSIS — H10.45 OTHER CHRONIC ALLERGIC CONJUNCTIVITIS: ICD-10-CM

## 2024-04-08 PROCEDURE — 99213 OFFICE O/P EST LOW 20 MIN: CPT | Performed by: OPTOMETRIST

## 2024-04-08 ASSESSMENT — INTRAOCULAR PRESSURE
OD: 10
OS: 10

## 2024-05-08 ENCOUNTER — OFFICE VISIT (OUTPATIENT)
Dept: URBAN - METROPOLITAN AREA CLINIC 75 | Facility: CLINIC | Age: 79
End: 2024-05-08
Payer: MEDICARE

## 2024-05-08 DIAGNOSIS — H16.223 KERATOCONJUNCTIVITIS SICCA, BILATERAL: ICD-10-CM

## 2024-05-08 DIAGNOSIS — H10.45 OTHER CHRONIC ALLERGIC CONJUNCTIVITIS: Primary | ICD-10-CM

## 2024-05-08 PROCEDURE — 99213 OFFICE O/P EST LOW 20 MIN: CPT | Performed by: OPTOMETRIST

## 2024-05-08 RX ORDER — FLUOROMETHOLONE 1 MG/ML
0.1 % SOLUTION/ DROPS OPHTHALMIC
Qty: 10 | Refills: 1 | Status: ACTIVE
Start: 2024-05-08

## 2024-05-08 RX ORDER — AZITHROMYCIN MONOHYDRATE 250 MG/1
250 MG TABLET, FILM COATED ORAL
Qty: 30 | Refills: 1 | Status: INACTIVE
Start: 2024-05-08 | End: 2024-05-12

## 2024-05-08 ASSESSMENT — INTRAOCULAR PRESSURE
OS: 12
OD: 20

## 2024-08-14 ENCOUNTER — OFFICE VISIT (OUTPATIENT)
Dept: URBAN - METROPOLITAN AREA CLINIC 75 | Facility: CLINIC | Age: 79
End: 2024-08-14
Payer: MEDICARE

## 2024-08-14 DIAGNOSIS — H52.4 PRESBYOPIA: ICD-10-CM

## 2024-08-14 DIAGNOSIS — H04.123 DRY EYE SYNDROME OF BILATERAL LACRIMAL GLANDS: Primary | ICD-10-CM

## 2024-08-14 PROCEDURE — 99213 OFFICE O/P EST LOW 20 MIN: CPT | Performed by: OPTOMETRIST

## 2024-08-14 ASSESSMENT — VISUAL ACUITY
OS: 20/20
OD: 20/20

## 2024-08-14 ASSESSMENT — INTRAOCULAR PRESSURE
OS: 10
OD: 10

## 2024-09-05 ENCOUNTER — OFFICE VISIT (OUTPATIENT)
Dept: URBAN - METROPOLITAN AREA CLINIC 75 | Facility: CLINIC | Age: 79
End: 2024-09-05
Payer: MEDICARE

## 2024-09-05 DIAGNOSIS — H04.123 DRY EYE SYNDROME OF BILATERAL LACRIMAL GLANDS: Primary | ICD-10-CM

## 2024-09-05 PROCEDURE — 99213 OFFICE O/P EST LOW 20 MIN: CPT | Performed by: OPTOMETRIST

## 2024-09-05 ASSESSMENT — INTRAOCULAR PRESSURE
OS: 9
OD: 12

## 2024-11-05 ENCOUNTER — OFFICE VISIT (OUTPATIENT)
Dept: URBAN - METROPOLITAN AREA CLINIC 72 | Facility: CLINIC | Age: 79
End: 2024-11-05
Payer: MEDICARE

## 2024-11-05 DIAGNOSIS — H10.45 OTHER CHRONIC ALLERGIC CONJUNCTIVITIS: Primary | ICD-10-CM

## 2024-11-05 DIAGNOSIS — H04.123 DRY EYE SYNDROME OF BILATERAL LACRIMAL GLANDS: ICD-10-CM

## 2024-11-05 PROCEDURE — 99213 OFFICE O/P EST LOW 20 MIN: CPT

## 2024-11-05 RX ORDER — NEOMYCIN SULFATE, POLYMYXIN B SULFATE AND DEXAMETHASONE 1; 3.5; 1 MG/ML; MG/ML; [USP'U]/ML
SUSPENSION OPHTHALMIC
Qty: 5 | Refills: 0 | Status: ACTIVE
Start: 2024-11-05

## 2024-12-05 ENCOUNTER — OFFICE VISIT (OUTPATIENT)
Dept: URBAN - METROPOLITAN AREA CLINIC 75 | Facility: CLINIC | Age: 79
End: 2024-12-05
Payer: MEDICARE

## 2024-12-05 DIAGNOSIS — H04.123 TEAR FILM INSUFFICIENCY OF BILATERAL LACRIMAL GLANDS: Primary | ICD-10-CM

## 2024-12-05 PROCEDURE — 99213 OFFICE O/P EST LOW 20 MIN: CPT | Performed by: OPTOMETRIST

## 2024-12-05 ASSESSMENT — INTRAOCULAR PRESSURE
OS: 13
OD: 15

## 2025-01-02 ENCOUNTER — OFFICE VISIT (OUTPATIENT)
Dept: URBAN - METROPOLITAN AREA CLINIC 75 | Facility: CLINIC | Age: 80
End: 2025-01-02
Payer: MEDICARE

## 2025-01-02 DIAGNOSIS — H43.813 VITREOUS DEGENERATION, BILATERAL: ICD-10-CM

## 2025-01-02 DIAGNOSIS — H35.361 DRUSEN (DEGENERATIVE) OF MACULA, RIGHT EYE: ICD-10-CM

## 2025-01-02 DIAGNOSIS — H04.123 TEAR FILM INSUFFICIENCY OF BILATERAL LACRIMAL GLANDS: ICD-10-CM

## 2025-01-02 DIAGNOSIS — H35.371 PUCKERING OF MACULA, RIGHT EYE: Primary | ICD-10-CM

## 2025-01-02 PROCEDURE — 92134 CPTRZ OPH DX IMG PST SGM RTA: CPT | Performed by: OPTOMETRIST

## 2025-01-02 PROCEDURE — 99214 OFFICE O/P EST MOD 30 MIN: CPT | Performed by: OPTOMETRIST

## 2025-01-02 ASSESSMENT — INTRAOCULAR PRESSURE
OD: 13
OS: 12

## 2025-07-14 ENCOUNTER — OFFICE VISIT (OUTPATIENT)
Dept: URBAN - METROPOLITAN AREA CLINIC 75 | Facility: CLINIC | Age: 80
End: 2025-07-14
Payer: MEDICARE

## 2025-07-14 DIAGNOSIS — H35.361 DRUSEN (DEGENERATIVE) OF MACULA, RIGHT EYE: ICD-10-CM

## 2025-07-14 DIAGNOSIS — H04.123 TEAR FILM INSUFFICIENCY OF BILATERAL LACRIMAL GLANDS: ICD-10-CM

## 2025-07-14 DIAGNOSIS — H43.813 VITREOUS DEGENERATION, BILATERAL: ICD-10-CM

## 2025-07-14 DIAGNOSIS — H35.371 PUCKERING OF MACULA, RIGHT EYE: Primary | ICD-10-CM

## 2025-07-14 PROCEDURE — 92134 CPTRZ OPH DX IMG PST SGM RTA: CPT | Performed by: OPTOMETRIST

## 2025-07-14 PROCEDURE — 92133 CPTRZD OPH DX IMG PST SGM ON: CPT | Performed by: OPTOMETRIST

## 2025-07-14 PROCEDURE — 99213 OFFICE O/P EST LOW 20 MIN: CPT | Performed by: OPTOMETRIST

## 2025-07-14 ASSESSMENT — INTRAOCULAR PRESSURE
OD: 13
OS: 10

## 2025-08-01 ENCOUNTER — OFFICE VISIT (OUTPATIENT)
Dept: URBAN - METROPOLITAN AREA CLINIC 75 | Facility: CLINIC | Age: 80
End: 2025-08-01

## 2025-08-01 DIAGNOSIS — H52.4 PRESBYOPIA: Primary | ICD-10-CM

## 2025-08-01 ASSESSMENT — INTRAOCULAR PRESSURE
OD: 9
OS: 9

## 2025-08-01 ASSESSMENT — VISUAL ACUITY
OS: 20/20
OD: 20/20

## 2025-08-22 ENCOUNTER — OFFICE VISIT (OUTPATIENT)
Dept: URBAN - METROPOLITAN AREA CLINIC 75 | Facility: CLINIC | Age: 80
End: 2025-08-22
Payer: MEDICARE

## 2025-08-22 DIAGNOSIS — H10.45 OTHER CHRONIC ALLERGIC CONJUNCTIVITIS: Primary | ICD-10-CM

## 2025-08-22 DIAGNOSIS — H16.223 KERATOCONJUNCTIVITIS SICCA, BILATERAL: ICD-10-CM

## 2025-08-22 PROCEDURE — 99213 OFFICE O/P EST LOW 20 MIN: CPT | Performed by: OPTOMETRIST

## 2025-08-22 ASSESSMENT — INTRAOCULAR PRESSURE
OS: 12
OD: 13